# Patient Record
(demographics unavailable — no encounter records)

---

## 2025-04-21 NOTE — HISTORY OF PRESENT ILLNESS
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 Completed course of Hemangeol   Rubs left nostril and mom has concerned with scar tissue in nose from hemangioma  No history of ear infections in the past 6 months. She sticks her fingers in the ears  Receives speech services  No hearing concerns reported by mother  History of nasal congestion with clear to yellow nasal discharge History of mouth breathing and is a mouth breather while asleep with snoring History of PFO and PDA which have resolved

## 2025-04-21 NOTE — BIRTH HISTORY
[de-identified] : maternal high blood pressure  [de-identified] : Mother in process of cardiac work up d/t slow heart rate and high blood pressure

## 2025-04-21 NOTE — HISTORY OF PRESENT ILLNESS
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 Completed course of Hemangeol   Rubs left nostril and mom has concerned with scar tissue in nose from hemangioma  No history of ear infections in the past 6 months. She sticks her fingers in the ears  Receives speech services  No hearing concerns reported by mother  History of nasal congestion with clear to yellow nasal discharge History of mouth breathing and is a mouth breather while asleep with snoring History of PFO and PDA which have resolved

## 2025-04-21 NOTE — PHYSICAL EXAM
[Effusion] : effusion [Exposed Vessel] : left anterior vessel not exposed [Wheezing] : no wheezing [Increased Work of Breathing] : no increased work of breathing with use of accessory muscles and retractions [de-identified] : left nasal tip much improved red discoloration 1x0.5cm and improvement fullness

## 2025-04-21 NOTE — DATA REVIEWED
[FreeTextEntry1] : An audiogram was performed today to evaluate eustachian tube status and hearing status and the results were reviewed and reveal: Tymps: AD type Btympanogram, AS type A tympanogram Soundfield/Thresholds: WNL

## 2025-04-21 NOTE — ASSESSMENT
[FreeTextEntry1] : This child is being treated for nasal tip hemangioma and is doing well off hemangeole,  prn saline for congestion, cont therapy for picky eating. Will observe snoring for now as good QOL, audio at fu.

## 2025-04-21 NOTE — PHYSICAL EXAM
[Effusion] : effusion [Exposed Vessel] : left anterior vessel not exposed [Wheezing] : no wheezing [Increased Work of Breathing] : no increased work of breathing with use of accessory muscles and retractions [de-identified] : left nasal tip much improved red discoloration 1x0.5cm and improvement fullness

## 2025-04-21 NOTE — CONSULT LETTER
[FreeTextEntry2] : Robert Lima MD [address]  [place] [address] [FreeTextEntry3] : Lea Ross MD  Pediatric Otolaryngology/ Head & Neck Surgery [facility]   [address] [place] [address] Tel [phone] Fax [phone]

## 2025-04-21 NOTE — BIRTH HISTORY
[de-identified] : maternal high blood pressure  [de-identified] : Mother in process of cardiac work up d/t slow heart rate and high blood pressure

## 2025-04-21 NOTE — CONSULT LETTER
[FreeTextEntry2] : Robert Lima MD [address]  [place] [address] [FreeTextEntry3] : Lea Ross MD  Pediatric Otolaryngology/ Head & Neck Surgery [facility]   [address] [place] [address] Tel [phone] Fax [phone]   Home